# Patient Record
Sex: MALE | Race: WHITE | NOT HISPANIC OR LATINO | Employment: FULL TIME | URBAN - METROPOLITAN AREA
[De-identification: names, ages, dates, MRNs, and addresses within clinical notes are randomized per-mention and may not be internally consistent; named-entity substitution may affect disease eponyms.]

---

## 2017-01-06 ENCOUNTER — HOSPITAL ENCOUNTER (OUTPATIENT)
Dept: CARDIAC REHAB | Facility: CLINIC | Age: 62
End: 2017-01-06
Attending: FAMILY MEDICINE
Payer: COMMERCIAL

## 2017-01-06 PROCEDURE — 40000116 ZZH STATISTIC OP CR VISIT: Performed by: OCCUPATIONAL THERAPIST

## 2017-01-06 PROCEDURE — 93798 PHYS/QHP OP CAR RHAB W/ECG: CPT | Performed by: OCCUPATIONAL THERAPIST

## 2017-01-10 ENCOUNTER — HOSPITAL ENCOUNTER (OUTPATIENT)
Dept: CARDIAC REHAB | Facility: CLINIC | Age: 62
End: 2017-01-10
Attending: FAMILY MEDICINE
Payer: COMMERCIAL

## 2017-01-10 PROCEDURE — 40000116 ZZH STATISTIC OP CR VISIT: Performed by: REHABILITATION PRACTITIONER

## 2017-01-10 PROCEDURE — 93798 PHYS/QHP OP CAR RHAB W/ECG: CPT | Performed by: REHABILITATION PRACTITIONER

## 2017-01-11 ENCOUNTER — HOSPITAL ENCOUNTER (OUTPATIENT)
Dept: CARDIAC REHAB | Facility: CLINIC | Age: 62
End: 2017-01-11
Attending: FAMILY MEDICINE
Payer: COMMERCIAL

## 2017-01-11 PROCEDURE — 40000116 ZZH STATISTIC OP CR VISIT: Performed by: OCCUPATIONAL THERAPIST

## 2017-01-11 PROCEDURE — 93798 PHYS/QHP OP CAR RHAB W/ECG: CPT | Performed by: OCCUPATIONAL THERAPIST

## 2017-01-13 ENCOUNTER — HOSPITAL ENCOUNTER (OUTPATIENT)
Dept: CARDIAC REHAB | Facility: CLINIC | Age: 62
End: 2017-01-13
Attending: FAMILY MEDICINE
Payer: COMMERCIAL

## 2017-01-13 PROCEDURE — 40000116 ZZH STATISTIC OP CR VISIT: Performed by: REHABILITATION PRACTITIONER

## 2017-01-13 PROCEDURE — 93798 PHYS/QHP OP CAR RHAB W/ECG: CPT | Performed by: REHABILITATION PRACTITIONER

## 2017-01-18 ENCOUNTER — HOSPITAL ENCOUNTER (OUTPATIENT)
Dept: CARDIAC REHAB | Facility: CLINIC | Age: 62
End: 2017-01-18
Attending: FAMILY MEDICINE
Payer: COMMERCIAL

## 2017-01-18 PROCEDURE — 40000116 ZZH STATISTIC OP CR VISIT

## 2017-01-18 PROCEDURE — 93798 PHYS/QHP OP CAR RHAB W/ECG: CPT

## 2017-01-20 ENCOUNTER — HOSPITAL ENCOUNTER (OUTPATIENT)
Dept: CARDIAC REHAB | Facility: CLINIC | Age: 62
End: 2017-01-20
Attending: FAMILY MEDICINE
Payer: COMMERCIAL

## 2017-01-20 PROCEDURE — 93798 PHYS/QHP OP CAR RHAB W/ECG: CPT

## 2017-01-20 PROCEDURE — 40000116 ZZH STATISTIC OP CR VISIT

## 2017-02-02 VITALS — HEIGHT: 70 IN | WEIGHT: 223.8 LBS | BODY MASS INDEX: 32.04 KG/M2

## 2017-02-02 ASSESSMENT — 6 MINUTE WALK TEST (6MWT)
TOTAL DISTANCE WALKED (FT): 1370
FEMALE CALC: 1497.72
PREDICTED: 1820.56
MALE CALC: 1809.53

## 2017-02-02 NOTE — PROGRESS NOTES
02/02/17 1400   Session   Session 90 Day Individualized Treatment Plan   Certified through this date 03/04/17   Cardiac Rehab Assessment   Cardiac Rehab Assessment 11/14/2016 PT is recovering from NSTEMI with stenting of  RCA and LAD along with marginal arteries.  PT had MI and stenting back in 2005.  PT reports he is doing well no symptoms since hospitalization.  PT is planning to attend rehab to learn new ways of eating low fat and to advance his exercise and get back to work.    11/28/16  PT was educated regarding cv responses and progress in rehab thus far.  He reports that he feels exercise progression has been appropriate.  He denies any issues with arthritis and does not see this a limiting factor to rehab exercise at this time.  He does note a sensation of palpitations lasing a few minutes that he has had frequently in the afternoon around 1:00.  He has not had ectopy noted in rehab thus far.  His next rehab session is at this time and reinforced that he tell therapist if he has this experience then.  He has an appt with Dr. Hutton on 11/30 and was advised to discuss this with him also.  He continues to benefit from skilled cardiac rehab for safe exercise progression and education/behavior change counseling related to decision balance regarding dietary changes and weight loss.  12/19/2016 PT feels good about the progress he is making in rehab.  PT continues to benefit from skilled training to safely advance exercise and motivate for lifestyle changes regarding nutrition , sedentary lifestyle and weight loss .  PT is currently not doing home exercise he does have a treadmill at home however he has not been using it.  PT was instructed in benefits of exercise and he is planning to start exercise one additional time.  PTs goal is working on weight loss he has not founds success in this area he states he is trying to eat less. PT was offered food log but declined at this time.  PT still wants to work on this  goal.  PT is still interested in attending the nutrition classes.  He was given an additional calendar and plans to attend class on Wednesday.  PT continues to work towards goals. PT is thinking about if he wants to be completed by the end of his year with rehab.  PT is scheduled through Dec 30 he will let us know at his next visit.2/2 Patient is returning one more time for a discharge session.   General Information   Treatment Diagnosis NSTEMI   Date of Treatment Diagnosis 11/07/16   Secondary Treatment Diagnosis Stent   Significant Past CV History Previous MI;Previous PCI;Clinical significant depression or depressive symptoms   Comorbidities Previous MI   Other Medical History Had been on depression medications for a couple years but now is not on any    Lead up symptoms exertional chest pain with planned stress test then angio with stenting.   Hospital Location [facility]   Hospital Discharge Date 11/09/16   Signs and Symptoms Post Hospital Discharge Anxiety;Sleep;Palpitations   Outpatient Cardiac Rehab Start Date 11/14/16   Primary Physician Dominguez Dupont   Primary Physician Follow Up Completed   Cardiologist Brennen    Cardiologist Follow Up Scheduled   Ejection Fraction 63   Risk Stratification Low   Summary of Cath Report   Summary of Cath Report No information available   Living and Work Status    Living Arrangements and Social Status house   Support System Live with an adult   Return to Employment Yes   Occupation works for [employer]   Preventative Medications   CMS recommended medications Antiplatelets;Beta Blocker;Lipid Lowering;Influenza vaccination   Falls Screen   Have you fallen two or more times in the past year? No   Have you fallen and had an injury in the past year? No   Pain   Patient Currently in Pain Denies   Physical Assessments   Incisions WNL   Edema None   Right Lung Sounds not assessed   Left Lung Sounds not assessed   Limitations No limitations   Comments does have  "arthritis in knees and back does not bother him on a daily basis    Individualized Treatment Plan   Monitored Sessions Scheduled 24   Monitored Sessions Attended 21   Oxygen   Supplemental Oxygen needed No   Nutrition Management - Weight Management   Assessment Re-assessment   Age 60   Weight 101.515 kg (223 lb 12.8 oz)   Height 1.778 m (5' 10\")   BMI (Calculated) 32.18   Initial Rate Your Plate Score. Dietary tool to assess eating patterns. Scores range from 24 to 72. The higher the score the healthier the eating pattern. 50   Weight Management Comments PT desires to work on weight loss see goals.  11/28/16  See goal.   12/19/2016 PT has gained 5 pounds since he started rehab.  PT continues to request to work on this goal however he is not willing to commit to doing something different like logging food or activity.   Nutrition Management - Lipids   Lipids Labs Available   Date 11/07/16   Total Cholesterol 146   Triglycerides 48   HDL 67   LDL 79   Prescribed Lipid Medication Yes   Statin Intensity High Intensity   Nutrition Management - Diabetes   Diabetes No   Nutrition Management Summary   Dietary Recommendations Low Fat;Low Sodium   Stages of Change for Diet Compliance Action   Interventions Planned Attend Nutrition Education Class(es);Educate on Benefits of Exercise   Patient Goals Goal #1;Goal #2   Goal #1 Description 11/14/2016 PT will work on losing weight 1-2 pounds per week to improve overall health    Goal #1 Target Date 01/13/17   Goal #1 Progress Towards Goal 11/28/16  PT is up in weight about 1.2 lbs since initial rehab session.  The trend has been variable and increase showed up after the holiday.  He reports that this is still a goal, however he appears to be in contemplation for dietary changes.  12/19/2016 Pt is up 5 pounds since he started rehab.  PT is not wanting to try a different approach at this time.  PT declined logging food or logging activity. PT plans to continue to work on eating less " "to assist with this goal.  2/2 2/2 Patient's weight continues to rise.   Goal #2 Description 11/14/2016 PT will attend the 3 nutrition classes to learn new ways to lower fat into his current diet.     Goal #2 Target Date 01/13/17   Goal #2 Progress Towards Goal 11/28/16  PT has not yet attended education classes.  There is a nutrition class next session, however he is unable to go due to M.D. appt.  He was provided with education class calendar as he states he is interested, however he is assessed to be in Contemplation for dietary changes.  Worked on verbal decision balance and encouraged pro/con list.  PT is generally stoic and states \"I know that I just need to do it.\"  He was attentive, however unable to verbalize any specific barriers.  He denies time or lack of knowledge to be factors.  He reports that he recognizes the benefits of dietary change in his weight loss goal and in decreasing CAD risk.  Encouraged small changes and attending dietary classes to increase knowledge as he contiues to identify this as an interest area.        12/19/2016 PT has not attended the nutrition classes yet he was strongly encouraged to make time for nutrition class. PT states he is planning to attend the nutrition 3 class on Dec 212/2 Patient dd not attend any nutrition class/   Nutrition Summary Comments PT is working on changing his diet , doing more frest foods vs processed foods.    11/28/16  PT has \"not really\" been concentrating on this anymore.  See goal above for details.      Nutrition Target Outcome Weight loss .5-1 lb/week (if BMI > 25)   Psychosocial Management   Psychosocial Assessment Re-assessment   Is there history of clinical depression or increased risk of depression? History of clinical depression   Current Level of Stress per Patient Report Moderate   (work related )   Current Coping Skills Has Positive Support System   Initial Patient Health Questionnaire -9 Score (PHQ-9) for depression. 5-9 Minimal " "symptoms, 10-14 Minor depression, 15-19 Major depression, moderately severe, > 20 Major depression, severe  3   Initial [facility] Survey score.  Quality of Life:   If total score > 25 review individual areas where patient rated a 4 or 5.  Consider patients current medical condition and what role that plays on the score.   Adjust treatment protocol to improve areas of concern.  Consider the following:  PHQ9 score, DASI, and re-assessment within the next 30 days to assist with developing treatments.  19   Stages of Change Pre-Contemplation   Interventions Planned Patient to verbalize understanding of negative impact of stress to personal health;Patient will recognize signs and symptoms of depression;Patient to attend stress management class(es);Provide resources for stress relaxation   Interventions In Progress or Completed Patient verbalizes understanding of behavioral assessment scores;Patient verbalizes understanding of negative impact of stress to personal health   Psychosocial Comments PT is raising 15 year old grand daughter  11/28/16  PT is generally stoic and gives short answers to questions.  He was educated regarding survey scores and impact of stress on health.   PHQ-9 score was WNL's and PT denies feelings of depression.  He continues to identify stress as \"moderate.\"  12/19/2016 PT continues to rate day to day stress as moderate he is not wanting to work on ways to reduce stress at this time.    Other Core Components - Hypertension   History of or Diagnosis of Hypertension No   Currently taking Anti-Hypertensives Yes;Beta blocker   Other Core Components - Tobacco   History of Tobacco Use Yes   Quit Date or Planned Quit Date 01/01/70   Tobacco Use Status Former (Quit > 6 mo ago)   Tobacco Habit Cigarettes   Stages of Change Maintenance   Other Core Components Summary   Interventions Planned None   Activity/Exercise History   Activity/Exercise Assessment Re-assessment   Activity/Exercise Status prior " to event? Was Physically Active   Number of Days Currently participating in Moderate Physical Activity? 5   Number of Days Currently performing  Aerobic Exercise (including rehab)? 3   Number of Minutes per Session Currently of Aerobic Exercise (average)? 35   Current Stage of Change (Physical Activity) Maintenance   Current Stage of Change (Aerobic Exercise) Pre-Contemplation   Activity/Exercise Comments PT is in precontemplation for home [id number]/19/2016 PT was instructed in benefits of exercise.  PT is planning to start using his treadmill at home at least one more day in addition to his rehab participation    Activity/Exercise Target Outcome An Accumulation of 150  Minutes of Aerobic Activity per Week   Exercise Assessment   6 Minute Walk Predicted (Male) 1809.53   6 Minute Walk Predicted (Female) 1497.72   Initial 6 Minute Walk Distance (Feet) 1370 ft   Resting HR 67 bpm  (HR and BPs from 1/20)   Exercise  bpm   Post Exercise HR 75 bpm   Resting /62 mmHg   Exercise /60 mmHg   Post Exercise /68 mmHg   Effort Rating 5   Current MET Level 4   MET Level Goal 5-6 mets   ECG Rhythm Normal sinus rhythm   Ectopy None   Current Symptoms Denies symptoms   Limitations/Restrictions None   Exercise Prescription   Mode Treadmill;Airdyne;Nustep;Weights   Duration/Time 30-45 min   Frequency 3 daysweek   THR (85% of age predicted max HR) 135.15   OMNI Effort Rating (0-10 Scale) 4-6/10   Progression Continuous bouts;Total exercise time of 30-45 minutes;Aerobic exercise to OMNI rating of 5-7, and heart rate at or below target;Progress peak intensity by 1/2 MET per week   Recommended Home Exercise   Type of Exercise Walking   Frequency (days per week) 2-3 days in addition to rehab    Duration (minutes per session) 30-45 min   Effort Rating Recommended 4-6/10   30 Day Exercise Plan PT to attend rehab 3 days per week he is not interested at this time in home exercise will continue to explain the benefits of  regular exercise on his overall health.   Current Home Exercise   Type of Exercise None   Follow-up/On-going Support   Provider follow-up needed on the following Other (see comments)   Comments PT wants an ok of when he can return to work.     Learning Assessment   Learner Patient   Primary Language English   Preferred Learning Style Listening;Reading;Demonstration   Barriers to Learning No barriers noted   Patient Education   Education recommended Anatomy and Physiology of the Heart;Exercise Principles;Medication Overview;Muscle Conditioning;Nutrition;Stress Management   Education Comments 11/28/16  PT has not yet attended education classes.  He was encouraged and provided with class calendar. 12/19/2016 PT is planning to attend the nutrition classes.     Physician cosignature/electronic signature indicates approval of this ITP document. I have established, reviewed and made necessary changes to the individualized treatment plan and exercise prescription for this patient.

## 2017-02-02 NOTE — ADDENDUM NOTE
Encounter addended by: Lesli Aragon OT on: 2/2/2017  2:32 PM<BR>     Documentation filed: Clinical Notes, Inpatient Document Flowsheet

## 2017-02-09 ENCOUNTER — HOSPITAL ENCOUNTER (OUTPATIENT)
Dept: CARDIAC REHAB | Facility: CLINIC | Age: 62
End: 2017-02-09
Attending: FAMILY MEDICINE
Payer: COMMERCIAL

## 2017-02-09 VITALS — HEIGHT: 70 IN | BODY MASS INDEX: 32.04 KG/M2 | WEIGHT: 223.8 LBS

## 2017-02-09 PROCEDURE — 40000116 ZZH STATISTIC OP CR VISIT: Performed by: REHABILITATION PRACTITIONER

## 2017-02-09 PROCEDURE — 40000575 ZZH STATISTIC OP CARDIAC VISIT #2: Performed by: REHABILITATION PRACTITIONER

## 2017-02-09 PROCEDURE — 93798 PHYS/QHP OP CAR RHAB W/ECG: CPT | Performed by: REHABILITATION PRACTITIONER

## 2017-02-09 ASSESSMENT — 6 MINUTE WALK TEST (6MWT)
MALE CALC: 1809.53
TOTAL DISTANCE WALKED (FT): 1370
PREDICTED: 1820.56
FEMALE CALC: 1497.72

## 2017-02-09 NOTE — PROGRESS NOTES
Physician cosignature/electronic signature indicates approval of this ITP document. I have established, reviewed and made necessary changes to the individualized treatment plan and exercise prescription for this patient.       02/09/17 1300   Session   Session Discharge Note   Certified through this date 03/04/17   Cardiac Rehab Assessment   Cardiac Rehab Assessment 11/28/16  PT was educated regarding cv responses and progress in rehab thus far.  He reports that he feels exercise progression has been appropriate.  He denies any issues with arthritis and does not see this a limiting factor to rehab exercise at this time.  He does note a sensation of palpitations lasing a few minutes that he has had frequently in the afternoon around 1:00.  He has not had ectopy noted in rehab thus far.  His next rehab session is at this time and reinforced that he tell therapist if he has this experience then.  He has an appt with Dr. Hutton on 11/30 and was advised to discuss this with him also.  He continues to benefit from skilled cardiac rehab for safe exercise progression and education/behavior change counseling related to decision balance regarding dietary changes and weight loss.  12/19/2016 PT feels good about the progress he is making in rehab.  PT continues to benefit from skilled training to safely advance exercise and motivate for lifestyle changes regarding nutrition , sedentary lifestyle and weight loss .  PT is currently not doing home exercise he does have a treadmill at home however he has not been using it.  PT was instructed in benefits of exercise and he is planning to start exercise one additional time.  PTs goal is working on weight loss he has not founds success in this area he states he is trying to eat less. PT was offered food log but declined at this time.  PT still wants to work on this goal.  PT is still interested in attending the nutrition classes.  He was given an additional calendar and plans to  attend class on Wednesday.  PT continues to work towards goals. PT is thinking about if he wants to be completed by the end of his year with rehab.  PT is scheduled through Dec 30 he will let us know at his next visit.2/2 Patient is returning one more time for a discharge session.  2/9 PHQ score increased as patient has not been able to return to work.  He is still on short-term disability and is waiting from his previous company for them to respond.  Patient radha did not meet any of his goals.  He has started a regular exercise program at home similar in progression to rehab.  He has been using the TM and weights every other day.  He did progress well in exercise intensity and CV response were wnls.  Patient declined any handouts for aerobic exercise and weights.     General Information   Treatment Diagnosis NSTEMI   Date of Treatment Diagnosis 11/07/16   Secondary Treatment Diagnosis Stent   Significant Past CV History Previous MI;Previous PCI;Clinical significant depression or depressive symptoms   Comorbidities Previous MI   Other Medical History Had been on depression medications for a couple years but now is not on any    Lead up symptoms exertional chest pain with planned stress test then angio with stenting.   Hospital Location [facility]   Hospital Discharge Date 11/09/16   Signs and Symptoms Post Hospital Discharge Anxiety;Sleep;Palpitations   Outpatient Cardiac Rehab Start Date 11/14/16   Primary Physician Dominguez Dupont   Primary Physician Follow Up Completed   Cardiologist Brennen    Cardiologist Follow Up Scheduled   Ejection Fraction 63   Risk Stratification Low   Summary of Cath Report   Summary of Cath Report No information available   Living and Work Status    Living Arrangements and Social Status house   Support System Live with an adult   Return to Employment Yes   Occupation works for [employer]   Preventative Medications   CMS recommended medications Antiplatelets;Beta  "Blocker;Lipid Lowering;Influenza vaccination   Falls Screen   Have you fallen two or more times in the past year? No   Have you fallen and had an injury in the past year? No   Pain   Patient Currently in Pain Denies   Physical Assessments   Incisions Not assessed   Edema None   Right Lung Sounds not assessed   Left Lung Sounds not assessed   Limitations No limitations   Comments does have arthritis in knees and back does not bother him on a daily basis    Individualized Treatment Plan   Monitored Sessions Scheduled 24   Monitored Sessions Attended 22   Oxygen   Supplemental Oxygen needed No   Nutrition Management - Weight Management   Assessment Re-assessment   Age 60   Weight 101.515 kg (223 lb 12.8 oz)   Height 1.778 m (5' 10\")   BMI (Calculated) 32.18   Initial Rate Your Plate Score. Dietary tool to assess eating patterns. Scores range from 24 to 72. The higher the score the healthier the eating pattern. 50   Discharge Rate Your Plate Score 53   Weight Management Comments PT desires to work on weight loss see goals.  11/28/16  See goal.   12/19/2016 PT has gained 5 pounds since he started rehab.  PT continues to request to work on this goal however he is not willing to commit to doing something different like logging food or activity.   Nutrition Management - Lipids   Lipids Labs Available   Date 11/07/16   Total Cholesterol 146   Triglycerides 48   HDL 67   LDL 79   Prescribed Lipid Medication Yes   Statin Intensity High Intensity   Nutrition Management - Diabetes   Diabetes No   Nutrition Management Summary   Dietary Recommendations Low Fat;Low Sodium   Stages of Change for Diet Compliance Action   Interventions Planned Attend Nutrition Education Class(es);Educate on Benefits of Exercise   Patient Goals Goal #1;Goal #2   Goal #1 Description 11/14/2016 PT will work on losing weight 1-2 pounds per week to improve overall health    Goal #1 Target Date 01/13/17   Goal #1 Progress Towards Goal 11/28/16  PT is up in " "weight about 1.2 lbs since initial rehab session.  The trend has been variable and increase showed up after the holiday.  He reports that this is still a goal, however he appears to be in contemplation for dietary changes.  12/19/2016 Pt is up 5 pounds since he started rehab.  PT is not wanting to try a different approach at this time.  PT declined logging food or logging activity. PT plans to continue to work on eating less to assist with this goal.  2/2 2/2 Patient's weight continues to rise.  2/9 Patient did not meet goal.   Goal #2 Description 11/14/2016 PT will attend the 3 nutrition classes to learn new ways to lower fat into his current diet.     Goal #2 Target Date 01/13/17   Goal #2 Progress Towards Goal 11/28/16  PT has not yet attended education classes.  There is a nutrition class next session, however he is unable to go due to M.D. appt.  He was provided with education class calendar as he states he is interested, however he is assessed to be in Contemplation for dietary changes.  Worked on verbal decision balance and encouraged pro/con list.  PT is generally stoic and states \"I know that I just need to do it.\"  He was attentive, however unable to verbalize any specific barriers.  He denies time or lack of knowledge to be factors.  He reports that he recognizes the benefits of dietary change in his weight loss goal and in decreasing CAD risk.  Encouraged small changes and attending dietary classes to increase knowledge as he contiues to identify this as an interest area.        12/19/2016 PT has not attended the nutrition classes yet he was strongly encouraged to make time for nutrition class. PT states he is planning to attend the nutrition 3 class on Dec 212/2 Patient dd not attend any nutrition class/  2/9 Patient did not attend any of the education classes   Nutrition Summary Comments PT is working on changing his diet , doing more frest foods vs processed foods.    11/28/16  PT has \"not really\" " "been concentrating on this anymore.  See goal above for details.      Nutrition Target Outcome Weight loss .5-1 lb/week (if BMI > 25)   Psychosocial Management   Psychosocial Assessment Re-assessment   Is there history of clinical depression or increased risk of depression? History of clinical depression   Current Level of Stress per Patient Report Moderate   (work related )   Current Coping Skills Has Positive Support System   Initial Patient Health Questionnaire -9 Score (PHQ-9) for depression. 5-9 Minimal symptoms, 10-14 Minor depression, 15-19 Major depression, moderately severe, > 20 Major depression, severe  3   Discharge PHQ-9 Score for Depression 7   [facility].  Quality of Life:   If total score > 25 review individual areas where patient rated a 4 or 5.  Consider patients current medical condition and what role that plays on the score.   Adjust treatment protocol to improve areas of concern.  Consider the following:  PHQ9 score, DASI, and re-assessment within the next 30 days to assist with developing treatments.  19   Discharge [facility] Survey Score 19   Stages of Change Pre-Contemplation   Interventions Planned Patient to verbalize understanding of negative impact of stress to personal health;Patient will recognize signs and symptoms of depression;Patient to attend stress management class(es);Provide resources for stress relaxation   Interventions In Progress or Completed Patient verbalizes understanding of behavioral assessment scores;Patient verbalizes understanding of negative impact of stress to personal health   Psychosocial Comments PT is raising 15 year old grand daughter  11/28/16  PT is generally stoic and gives short answers to questions.  He was educated regarding survey scores and impact of stress on health.   PHQ-9 score was WNL's and PT denies feelings of depression.  He continues to identify stress as \"moderate.\"  12/19/2016 PT continues to rate day to day " stress as moderate he is not wanting to work on ways to reduce stress at this time.    Other Core Components - Hypertension   History of or Diagnosis of Hypertension No   Currently taking Anti-Hypertensives Yes;Beta blocker   Other Core Components - Tobacco   History of Tobacco Use Yes   Quit Date or Planned Quit Date 01/01/70   Tobacco Use Status Former (Quit > 6 mo ago)   Tobacco Habit Cigarettes   Stages of Change Maintenance   Other Core Components Summary   Interventions Planned None   Activity/Exercise History   Activity/Exercise Assessment Re-assessment   Activity/Exercise Status prior to event? Was Physically Active   Number of Days Currently participating in Moderate Physical Activity? 5   Number of Days Currently performing  Aerobic Exercise (including rehab)? 4   Number of Minutes per Session Currently of Aerobic Exercise (average)? 35   Current Stage of Change (Physical Activity) Maintenance   Current Stage of Change (Aerobic Exercise) Pre-Contemplation   Activity/Exercise Comments PT is in precontemplation for home [id number]/19/2016 PT was instructed in benefits of exercise.  PT is planning to start using his treadmill at home at least one more day in addition to his rehab participation  2/9 Patient has been using the treadmill everyother day for 35-40 min and has been slowly increasing his intensities.  He has also been completing the muscle conditioning exercises.   Activity/Exercise Target Outcome An Accumulation of 150  Minutes of Aerobic Activity per Week   Exercise Assessment   6 Minute Walk Predicted (Male) 1809.53   6 Minute Walk Predicted (Female) 1497.72   Initial 6 Minute Walk Distance (Feet) 1370 ft   Resting HR 73 bpm   Exercise HR 98 bpm   Post Exercise HR 73 bpm   Resting /62 mmHg   Exercise /68 mmHg   Post Exercise /70 mmHg   Effort Rating 5   Current MET Level 4   MET Level Goal 5-6 mets   ECG Rhythm Normal sinus rhythm   Ectopy None   Current Symptoms Denies symptoms    Limitations/Restrictions None   Exercise Prescription   Mode Treadmill;Airdyne;Nustep;Weights   Duration/Time 30-45 min   Frequency 3 daysweek   THR (85% of age predicted max HR) 135.15   OMNI Effort Rating (0-10 Scale) 4-6/10   Progression Continuous bouts;Total exercise time of 30-45 minutes;Aerobic exercise to OMNI rating of 5-7, and heart rate at or below target;Progress peak intensity by 1/2 MET per week   Recommended Home Exercise   Type of Exercise Walking   Frequency (days per week) 2-3 days in addition to rehab    Duration (minutes per session) 30-45 min   Effort Rating Recommended 4-6/10   30 Day Exercise Plan PT to attend rehab 3 days per week he is not interested at this time in home exercise will continue to explain the benefits of regular exercise on his overall health.   Current Home Exercise   Type of Exercise Treadmill  3-4 days/wk for 35 min   Follow-up/On-going Support   Provider follow-up needed on the following Other (see comments)   Comments PT wants an ok of when he can return to work.     Learning Assessment   Learner Patient   Primary Language English   Preferred Learning Style Listening;Reading;Demonstration   Barriers to Learning No barriers noted   Patient Education   Education recommended Anatomy and Physiology of the Heart;Exercise Principles;Medication Overview;Muscle Conditioning;Nutrition;Stress Management   Education Comments 11/28/16  PT has not yet attended education classes.  He was encouraged and provided with class calendar. 12/19/2016 PT is planning to attend the nutrition classes.

## 2019-03-26 ENCOUNTER — HOSPITAL ENCOUNTER (EMERGENCY)
Facility: CLINIC | Age: 64
Discharge: HOME | End: 2019-03-26
Attending: PHYSICIAN ASSISTANT | Admitting: PHYSICIAN ASSISTANT
Payer: COMMERCIAL

## 2019-03-26 ENCOUNTER — APPOINTMENT (OUTPATIENT)
Dept: CT IMAGING | Facility: CLINIC | Age: 64
End: 2019-03-26
Attending: PHYSICIAN ASSISTANT
Payer: COMMERCIAL

## 2019-03-26 VITALS
HEIGHT: 70 IN | BODY MASS INDEX: 32.93 KG/M2 | HEART RATE: 81 BPM | WEIGHT: 230 LBS | DIASTOLIC BLOOD PRESSURE: 74 MMHG | OXYGEN SATURATION: 99 % | RESPIRATION RATE: 18 BRPM | TEMPERATURE: 98.8 F | SYSTOLIC BLOOD PRESSURE: 158 MMHG

## 2019-03-26 DIAGNOSIS — T50.8X5A ALLERGIC REACTION TO CONTRAST DYE, INITIAL ENCOUNTER: ICD-10-CM

## 2019-03-26 DIAGNOSIS — M54.2 NECK PAIN ON LEFT SIDE: ICD-10-CM

## 2019-03-26 PROCEDURE — 70450 CT HEAD/BRAIN W/O DYE: CPT

## 2019-03-26 PROCEDURE — 25000125 ZZHC RX 250: Performed by: PHYSICIAN ASSISTANT

## 2019-03-26 PROCEDURE — 96375 TX/PRO/DX INJ NEW DRUG ADDON: CPT

## 2019-03-26 PROCEDURE — 25000132 ZZH RX MED GY IP 250 OP 250 PS 637: Performed by: PHYSICIAN ASSISTANT

## 2019-03-26 PROCEDURE — 25000128 H RX IP 250 OP 636: Performed by: PHYSICIAN ASSISTANT

## 2019-03-26 PROCEDURE — 99285 EMERGENCY DEPT VISIT HI MDM: CPT | Mod: 25

## 2019-03-26 PROCEDURE — 96374 THER/PROPH/DIAG INJ IV PUSH: CPT | Mod: 59

## 2019-03-26 PROCEDURE — 70498 CT ANGIOGRAPHY NECK: CPT

## 2019-03-26 RX ORDER — DIPHENHYDRAMINE HYDROCHLORIDE 50 MG/ML
50 INJECTION INTRAMUSCULAR; INTRAVENOUS ONCE
Status: COMPLETED | OUTPATIENT
Start: 2019-03-26 | End: 2019-03-26

## 2019-03-26 RX ORDER — METHYLPREDNISOLONE SODIUM SUCCINATE 125 MG/2ML
125 INJECTION, POWDER, LYOPHILIZED, FOR SOLUTION INTRAMUSCULAR; INTRAVENOUS ONCE
Status: COMPLETED | OUTPATIENT
Start: 2019-03-26 | End: 2019-03-26

## 2019-03-26 RX ORDER — IOPAMIDOL 755 MG/ML
70 INJECTION, SOLUTION INTRAVASCULAR ONCE
Status: COMPLETED | OUTPATIENT
Start: 2019-03-26 | End: 2019-03-26

## 2019-03-26 RX ORDER — ACETAMINOPHEN 325 MG/1
650 TABLET ORAL ONCE
Status: COMPLETED | OUTPATIENT
Start: 2019-03-26 | End: 2019-03-26

## 2019-03-26 RX ORDER — CYCLOBENZAPRINE HCL 10 MG
10 TABLET ORAL 3 TIMES DAILY PRN
Qty: 20 TABLET | Refills: 0 | Status: SHIPPED | OUTPATIENT
Start: 2019-03-26 | End: 2019-04-01

## 2019-03-26 RX ORDER — CYCLOBENZAPRINE HCL 10 MG
10 TABLET ORAL ONCE
Status: COMPLETED | OUTPATIENT
Start: 2019-03-26 | End: 2019-03-26

## 2019-03-26 RX ADMIN — CYCLOBENZAPRINE HYDROCHLORIDE 10 MG: 10 TABLET, FILM COATED ORAL at 17:36

## 2019-03-26 RX ADMIN — METHYLPREDNISOLONE SODIUM SUCCINATE 125 MG: 125 INJECTION, POWDER, FOR SOLUTION INTRAMUSCULAR; INTRAVENOUS at 18:42

## 2019-03-26 RX ADMIN — SODIUM CHLORIDE 90 ML: 9 INJECTION, SOLUTION INTRAVENOUS at 18:33

## 2019-03-26 RX ADMIN — DIPHENHYDRAMINE HYDROCHLORIDE 50 MG: 50 INJECTION, SOLUTION INTRAMUSCULAR; INTRAVENOUS at 18:39

## 2019-03-26 RX ADMIN — IOPAMIDOL 70 ML: 755 INJECTION, SOLUTION INTRAVENOUS at 18:33

## 2019-03-26 RX ADMIN — ACETAMINOPHEN 650 MG: 325 TABLET, FILM COATED ORAL at 17:36

## 2019-03-26 ASSESSMENT — ENCOUNTER SYMPTOMS
NUMBNESS: 0
FEVER: 0
WEAKNESS: 0
SHORTNESS OF BREATH: 0
HEADACHES: 1
NECK PAIN: 1

## 2019-03-26 ASSESSMENT — MIFFLIN-ST. JEOR: SCORE: 1839.52

## 2019-03-26 NOTE — ED NOTES
Pt returned from CT c/o itching following contrast. Pt w/ rash to torso and left arm. On pulse ox sats at 99% RA

## 2019-03-26 NOTE — ED AVS SNAPSHOT
Emergency Department  [address] [place] [id number]  Phone:  [phone]  Fax:  [phone]                                    Que Corral   MRN: [id number]    Department:   Emergency Department   Date of Visit:  3/26/2019           After Visit Summary Signature Page    I have received my discharge instructions, and my questions have been answered. I have discussed any challenges I see with this plan with the nurse or doctor.    ..........................................................................................................................................  Patient/Patient Representative Signature      ..........................................................................................................................................  Patient Representative Print Name and Relationship to Patient    ..................................................               ................................................  Date                                   Time    ..........................................................................................................................................  Reviewed by Signature/Title    ...................................................              ..............................................  Date                                               Time          22EPIC Rev 08/18

## 2019-03-26 NOTE — ED PROVIDER NOTES
"  History     Chief Complaint:  Neck Pain    The history is provided by the patient.      Que Corral is a 63 year old male nonsmoker with a history of hypertension and MI (s/p 7 cardiac stents) who presents for evaluation of neck pain. The patient reports that he was at baseline before Sunday morning (2 days ago) when he woke up with a \"cramp\" in his posterior neck which radiates somewhat into his left shoulder. States that this is made worse by sitting up and moving around. Notes that the night before this onset he slept on \"crappy\" pillows at [place]. Also endorses mild headache which seems to be isolated to the posterior left. Has been treating this with a heating pad and Advil (most recent dose at 12:00 PM today) which have somewhat helped. Denies history of similar pain. Denies recent chiropractic treatment. Patient denies abnormal vision, trauma, numbness/tingling, weakness, chest pain, shortness of breath, fever, or other complaint.     Allergies:  Contrast Dye     Medications:    Aspirin   Lipitor  Vitamin D3  Metoprolol  Nitroglycerin    Past Medical History:    Anxiety  Hypertension  MI  Stented coronary artery    Past Surgical History:    Arthroplasty carpometacarpal (thumb joint)  Coronary stent  Aortic stent    Family History:    History reviewed. No pertinent family history.      Social History:  Presents with spouse   Tobacco use: Former smoker  Alcohol use: Yes (35 cans of beer per week)  PCP: Dominguez Dupont    Marital Status:       Review of Systems   Constitutional: Negative for fever.   Eyes: Negative for visual disturbance.   Respiratory: Negative for shortness of breath.    Cardiovascular: Negative for chest pain.   Musculoskeletal: Positive for neck pain.   Neurological: Positive for headaches (mild). Negative for weakness and numbness.   All other systems reviewed and are negative.    Physical Exam     Patient Vitals for the past 24 hrs:   BP Temp Temp src Pulse Heart Rate " "Resp SpO2 Height Weight   03/26/19 2000 158/74 -- -- -- 80 18 99 % -- --   03/26/19 1945 -- -- -- -- -- -- 95 % -- --   03/26/19 1905 -- -- -- -- -- -- 96 % -- --   03/26/19 1900 -- -- -- -- -- -- 95 % -- --   03/26/19 1644 163/86 98.8  F (37.1  C) Oral 81 -- 18 98 % 1.778 m (5' 10\") 104.3 kg (230 lb)      Physical Exam  General: Resting comfortably.  Alert and oriented.   Head:  The scalp, face, and head appear normal   Eyes:  The pupils are equal, round, and reactive to light     Extraocular muscles are intact    Conjunctivae and sclerae are normal    ENT:    The oropharynx is normal    Uvula is in the midline     Bilateral TMs are normal without evidence of infection.   Neck:  Normal range of motion    There is no rigidity noted    No lymphadenopathy    There is no midline cervical spine pain/tenderness     There is left-sided paracervical tenderness.  CV:  Regular rate and rhythm     Normal S1/S2    Radial pulses intact bilaterally.  Resp:  Lungs are clear to auscultation    Non-labored    No rales or wheezing   MS:  Tenderness to the left paracervical, and trapezial areas.  There is no tenderness to the left arm. The patient has preserved bicep flexion, and tricep extension bilaterally.  Preserved deltoid strength bilaterally.  Patient can hold fingers and resisted abduction, hold the okay sign against resistance, and touch all fingers to the thumb.  Patient has good strength with wrist extension.  Skin:  No rash or acute skin lesions noted   Neuro: Speech is normal and fluent. Cranial nerves 2-12 grossly intact.  strength is equal. Strength and sensation intact in all 4 extremities. Finger-nose finger and heel shin intact. No focal deficits.     Emergency Department Course     Imaging:  Radiographic findings were communicated with the patient and family who voiced understanding of the findings.    CT Head without contrast:  IMPRESSION: No acute intracranial abnormality.    CT Head Neck Angio without & with " contrast:  IMPRESSION:   CTA head: Patent vasculature with mild atherosclerotic plaquing.    CTA neck: No evidence of dissection. Mild plaquing at the carotid bifurcations without evidence of flow-limiting stenosis. Mild plaquing at the right vertebral artery origin.    Imaging independently reviewed and agree with radiologist interpretation.       Interventions:  1736: Tylenol 650 mg PO  1736: Flexeril 10 mg PO  1839: Benadryl 50 mg IV   1842: Solu-Medrol 125 mg IV    Emergency Department Course:  Past medical records, nursing notes, and vitals reviewed.  1715: I performed an exam of the patient and obtained history, as documented above.    IV inserted and blood drawn. Above interventions provided. Blood was sent to the lab for further testing, results above.    1835: The patient began to experience hives and itching after administration of contrast. I rechecked the patient. Benadryl given. Plan will be to CT the head with no contrast. Patient and spouse are okay with this plan.    The patient was sent for a CT, CTA while in the emergency department, findings above.    1925: The patient's hives are looking improved.    1901: I rechecked the patient.  Findings and plan explained to the Patient. Patient discharged home with instructions regarding supportive care, medications, and reasons to return. The importance of close follow-up was reviewed.       Impression & Plan      Medical Decision Making:  Que Corral is a 63 year old male who presents for evaluation of left-sided neck pain.  He states that he felt onset when he woke up at the [employer] 2 days ago after sleeping in a hotel. He awoke and felt this pain to the left side of the neck which traverses down his left trapezius which has increased in intensity.  He was initially evaluated by his primary care doctor and suggested he come here for evaluation, possible head CT, and cervical x-rays. Denies any trauma or falls, therefore, I did not think x-rays  would be helpful at this time given the very low likelihood of fracture or any other abnormalities.  Certainly, the patient does have risk factors for vessel injury given his history of coronary artery disease.  The patient has no neurologic symptoms but does intermittently complain of a headache associated with this.  He denies any visual changes.  We discussed the possibilities of management at this point including a CT of the head and neck vs watchful waiting at home.  After a discussion and shared decision-making, patient opted for CTA imaging. Fortunately, this was unremarkable for any signs of dissection or any acute abnormality.  This does demonstrate some plaque formation in the left vertebral and carotid arteries as well as evidence of stenosis.  Upon administration of IV contrast, the patient did have hives and itchy rash.  There is no development of airway symptoms and the patient did not require epinephrine.  The patient instead was given Benadryl and Solu-Medrol.  After about 30 minutes, the hives diminished and his skin returned to baseline and the itching has stopped.  This reaction was noted in the patient's chart.  After the observation, the patient is asymptomatic and has no ongoing airway symptoms or any concern that he would need to stay here for further evaluation.  I do not think that further workup needs to be pursued at this time and I think his symptoms are most likely due to a musculoskeletal source.  Patient does feel improved after a muscle relaxer here.  Patient was sent home with flexeril prescription and advised to take ibuprofen and Tylenol.  He was asked to follow-up with his primary care doctor in 2-3 days for return.  He was also asked to return immediately for any weakness, tingling, uncontrolled pain, fevers, or any other concerns.  All questions were answered prior to discharge.  Patient understands and agrees to this plan.    Diagnosis:    ICD-10-CM   1. Neck pain on left side  M54.2   2. Allergic reaction to contrast dye, initial encounter T50.8X5A       Disposition:  Discharged to home with plan as outlined.     Scribe Disclosure:  I, Jose Salinas, am serving as a scribe at 5:19 PM on 3/26/2019 to document services personally performed by Roshni Humphreys PA based on my observations and the provider's statements to me.  3/26/2019   EMERGENCY DEPARTMENT     Roshni Humphreys PA-C  03/26/19 [id number]

## 2019-03-27 NOTE — DISCHARGE INSTRUCTIONS
Discharge Instructions  Neck Strain    You have been seen today for a neck sprain or strain.  Neck strains usually result from an injury to the neck. Car accidents, contact sports, and falls are common causes of neck strain. Sometimes your neck can start to hurt because of increased activity, muscle tension, an abnormal sleeping position, or because of other problems like arthritis in the neck.     Neck pain usually comes from injured muscles and ligaments. Sometimes there is a herniated (?slipped?) disc. We do not usually do MRI scans to look for these right away, since most herniated discs will get better on their own with time. Today, we did not find any evidence that your neck pain was caused by a serious or dangerous condition. However, sometimes symptoms develop over time and cannot be found during an emergency visit, so it is very important that you follow up with your primary provider.    Generally, every Emergency Department visit should have a follow-up clinic visit with either a primary or a specialty clinic/provider. Please follow-up as instructed by your emergency provider today.    Return to the Emergency Department if:  You have increasing pain in your neck.  You develop difficulty swallowing or breathing.  You have numbness, weakness, or trouble moving your arms or legs.  You have severe dizziness and difficulty walking.  You are unable to control your bladder or bowels.  You develop severe headache or ringing in the ears.    What can I do to help myself at home?  If you had an injury, use cold for the first 1-2 days. Cold helps relieve pain and reduce inflammation.  Apply ice packs to the neck or areas of pain every 1-2 hours for 20 minutes at a time. Place a towel or cloth between your skin and the ice pack.  After the first 2 days, using heat can help with neck pain and stiffness. You may use a warm shower or bath, warm towels on the neck, or a heating pad. Do not sleep with a heating pad, as you  can be burned.   Pain medications - You may take a pain medication such as Tylenol  (acetaminophen), Advil  and Motrin  (ibuprofen), or Aleve  (naproxen).  It is usually best to rest the neck for 1-2 days after an injury, then start gentle stretching exercises.   It is helpful to place a small pillow under the nape of your neck to provide proper neutral positioning.   You should stay active and do your usual work as much as you can, unless this involves heavy physical labor. Ask your provider if you need work restrictions.  If you were given a prescription for medicine here today, be sure to read all of the information (including the package insert) that comes with your prescription.  This will include important information about the medicine, its side effects, and any warnings that you need to know about.  The pharmacist who fills the prescription can provide more information and answer questions you may have about the medicine.  If you have questions or concerns that the pharmacist cannot address, please call or return to the Emergency Department.   Remember that you can always come back to the Emergency Department if you are not able to see your regular provider in the amount of time listed above, if you get any new symptoms, or if there is anything that worries you.    Discharge Instructions  Allergic Reaction    An allergic reaction can result in a rash, itching, swelling, watery eyes, or a runny nose. A serious reaction can cause swelling of your mouth or throat, or difficulty breathing (wheezing). The most serious allergy is called anaphylaxis, and can be life-threatening. Many allergies result in hives, also called urticaria.       An allergy happens when the body?s natural defense system (immune system) overreacts to something. The thing that triggers your allergic reaction is called an allergen. The first time you are exposed to your allergen, you may not have any reaction, but the body makes a protein  called an antibody. The antibody lets the body recognize and remember the allergen.  Every time you are exposed to your allergen you get more antibody and your reaction can be more severe.      Generally, every Emergency Department visit should have a follow-up clinic visit with either a primary or a specialty clinic/provider. Please follow-up as instructed by your emergency provider today.    Call 911 if you have:  Swelling of the lips, tongue or throat.  Hoarse voice, drooling or trouble breathing.  Chest pain or shortness of breath.  Fainting or unconsciousness.    What can I do to help myself?  If you know what caused your allergy, do not touch it, throw any of it away, and tell others not to have it around you. Wear a medical alert bracelet with a name of your allergen on it.  If you do not know what you are allergic to, keep a journal of everything that you are exposed to (foods, soaps, medicines, etc.). Take this with you when you follow up with your primary provider or specialist (Allergist). This may help determine what is causing the allergic reaction.  Take any medicines that are prescribed.  Antihistamines can decrease rash or itching. You may use Benadryl  (diphenhydramine) for rash or itching according to package directions, or use a prescription antihistamine as recommended by your provider.  For significant allergic reactions, you may have been given a prescription for an epinephrine (adrenaline) auto injector. Carry this with you at all times! Use it if you are having any symptoms of anaphylaxis.  Do not be afraid to use it. Return to the Emergency Department if you use your auto injector, call 911 if it does not resolve the symptoms. It is only meant to buy time until you can get to the Emergency Department!  If you were given a prescription for medicine here today, be sure to read all of the information (including the package insert) that comes with your prescription.  This will include important  information about the medicine, its side effects, and any warnings that you need to know about.  The pharmacist who fills the prescription can provide more information and answer questions you may have about the medicine.  If you have questions or concerns that the pharmacist cannot address, please call or return to the Emergency Department.   Remember that you can always come back to the Emergency Department if you are not able to see your regular provider in the amount of time listed above, if you get any new symptoms, or if there is anything that worries you.

## 2019-11-07 ENCOUNTER — APPOINTMENT (OUTPATIENT)
Dept: CT IMAGING | Facility: CLINIC | Age: 64
End: 2019-11-07
Attending: NURSE PRACTITIONER
Payer: COMMERCIAL

## 2019-11-07 ENCOUNTER — HOSPITAL ENCOUNTER (EMERGENCY)
Facility: CLINIC | Age: 64
Discharge: HOME | End: 2019-11-07
Attending: NURSE PRACTITIONER | Admitting: NURSE PRACTITIONER
Payer: COMMERCIAL

## 2019-11-07 VITALS
TEMPERATURE: 98.1 F | HEART RATE: 101 BPM | WEIGHT: 235 LBS | RESPIRATION RATE: 22 BRPM | HEIGHT: 70 IN | OXYGEN SATURATION: 96 % | DIASTOLIC BLOOD PRESSURE: 101 MMHG | BODY MASS INDEX: 33.64 KG/M2 | SYSTOLIC BLOOD PRESSURE: 157 MMHG

## 2019-11-07 DIAGNOSIS — M54.2 NECK PAIN ON LEFT SIDE: ICD-10-CM

## 2019-11-07 LAB
ANION GAP SERPL CALCULATED.3IONS-SCNC: 5 MMOL/L (ref 3–14)
BASOPHILS # BLD AUTO: 0.1 10E9/L (ref 0–0.2)
BASOPHILS NFR BLD AUTO: 0.8 %
BUN SERPL-MCNC: 8 MG/DL (ref 7–30)
CALCIUM SERPL-MCNC: 8.3 MG/DL (ref 8.5–10.1)
CHLORIDE SERPL-SCNC: 109 MMOL/L (ref 94–109)
CO2 SERPL-SCNC: 24 MMOL/L (ref 20–32)
CREAT SERPL-MCNC: 0.58 MG/DL (ref 0.66–1.25)
CRP SERPL-MCNC: <2.9 MG/L (ref 0–8)
DIFFERENTIAL METHOD BLD: NORMAL
EOSINOPHIL # BLD AUTO: 0.2 10E9/L (ref 0–0.7)
EOSINOPHIL NFR BLD AUTO: 2.7 %
ERYTHROCYTE [DISTWIDTH] IN BLOOD BY AUTOMATED COUNT: 12.8 % (ref 10–15)
ERYTHROCYTE [SEDIMENTATION RATE] IN BLOOD BY WESTERGREN METHOD: 12 MM/H (ref 0–20)
GFR SERPL CREATININE-BSD FRML MDRD: >90 ML/MIN/{1.73_M2}
GLUCOSE SERPL-MCNC: 115 MG/DL (ref 70–99)
HCT VFR BLD AUTO: 48.3 % (ref 40–53)
HGB BLD-MCNC: 16.7 G/DL (ref 13.3–17.7)
IMM GRANULOCYTES # BLD: 0 10E9/L (ref 0–0.4)
IMM GRANULOCYTES NFR BLD: 0.2 %
LYMPHOCYTES # BLD AUTO: 1.8 10E9/L (ref 0.8–5.3)
LYMPHOCYTES NFR BLD AUTO: 27.6 %
MCH RBC QN AUTO: 33 PG (ref 26.5–33)
MCHC RBC AUTO-ENTMCNC: 34.6 G/DL (ref 31.5–36.5)
MCV RBC AUTO: 96 FL (ref 78–100)
MONOCYTES # BLD AUTO: 1 10E9/L (ref 0–1.3)
MONOCYTES NFR BLD AUTO: 14.4 %
NEUTROPHILS # BLD AUTO: 3.6 10E9/L (ref 1.6–8.3)
NEUTROPHILS NFR BLD AUTO: 54.3 %
NRBC # BLD AUTO: 0 10*3/UL
NRBC BLD AUTO-RTO: 0 /100
PLATELET # BLD AUTO: 305 10E9/L (ref 150–450)
POTASSIUM SERPL-SCNC: 4.2 MMOL/L (ref 3.4–5.3)
RBC # BLD AUTO: 5.06 10E12/L (ref 4.4–5.9)
SODIUM SERPL-SCNC: 138 MMOL/L (ref 133–144)
WBC # BLD AUTO: 6.6 10E9/L (ref 4–11)

## 2019-11-07 PROCEDURE — 25000132 ZZH RX MED GY IP 250 OP 250 PS 637: Performed by: NURSE PRACTITIONER

## 2019-11-07 PROCEDURE — 86140 C-REACTIVE PROTEIN: CPT | Performed by: NURSE PRACTITIONER

## 2019-11-07 PROCEDURE — 99284 EMERGENCY DEPT VISIT MOD MDM: CPT | Mod: 25

## 2019-11-07 PROCEDURE — 85652 RBC SED RATE AUTOMATED: CPT | Performed by: NURSE PRACTITIONER

## 2019-11-07 PROCEDURE — 80048 BASIC METABOLIC PNL TOTAL CA: CPT | Performed by: NURSE PRACTITIONER

## 2019-11-07 PROCEDURE — 70490 CT SOFT TISSUE NECK W/O DYE: CPT

## 2019-11-07 PROCEDURE — 85025 COMPLETE CBC W/AUTO DIFF WBC: CPT | Performed by: NURSE PRACTITIONER

## 2019-11-07 RX ORDER — CYCLOBENZAPRINE HCL 10 MG
10 TABLET ORAL 3 TIMES DAILY PRN
Qty: 20 TABLET | Refills: 0 | Status: SHIPPED | OUTPATIENT
Start: 2019-11-07 | End: 2019-11-14

## 2019-11-07 RX ORDER — HYDROCODONE BITARTRATE AND ACETAMINOPHEN 5; 325 MG/1; MG/1
1 TABLET ORAL ONCE
Status: COMPLETED | OUTPATIENT
Start: 2019-11-07 | End: 2019-11-07

## 2019-11-07 RX ORDER — PREDNISONE 20 MG/1
TABLET ORAL
Qty: 10 TABLET | Refills: 0 | Status: SHIPPED | OUTPATIENT
Start: 2019-11-07

## 2019-11-07 RX ADMIN — HYDROCODONE BITARTRATE AND ACETAMINOPHEN 1 TABLET: 5; 325 TABLET ORAL at 16:23

## 2019-11-07 ASSESSMENT — ENCOUNTER SYMPTOMS
HEADACHES: 0
TROUBLE SWALLOWING: 1
FEVER: 0
FACIAL SWELLING: 1

## 2019-11-07 ASSESSMENT — MIFFLIN-ST. JEOR: SCORE: 1862.2

## 2019-11-07 NOTE — ED AVS SNAPSHOT
Emergency Department  [address] [place] [id number]  Phone:  [phone]  Fax:  [phone]                                    Que Corral   MRN: [id number]    Department:   Emergency Department   Date of Visit:  11/7/2019           After Visit Summary Signature Page    I have received my discharge instructions, and my questions have been answered. I have discussed any challenges I see with this plan with the nurse or doctor.    ..........................................................................................................................................  Patient/Patient Representative Signature      ..........................................................................................................................................  Patient Representative Print Name and Relationship to Patient    ..................................................               ................................................  Date                                   Time    ..........................................................................................................................................  Reviewed by Signature/Title    ...................................................              ..............................................  Date                                               Time          22EPIC Rev 08/18

## 2019-11-07 NOTE — ED TRIAGE NOTES
Left sided neck and facial swelling with pain X 1 week. Pt reports similar episode that occurred in march- was seen here. Reports difficulty swallowing sometimes. Pt states that it began with cold s/sx which have since subsided

## 2019-11-07 NOTE — ED PROVIDER NOTES
History     Chief Complaint:  Facial swelling     HPI  Que Corral is a 63 year old male with history of hypertension and MI (s/p cardiac stent x 7-2005x1, 2016x3, 2017x2) who presents with facial swelling. The patient has had a gradual onset pain on the left side of his face, just posterior to the ear and over the left occiput, for for the last week or so that feels like burning. Today, he noticed subjective swelling to the area. He has had no trouble opening his mouth and is able to move his head from left to right. He notes a resolving upper respiratory infection and feels his left ear is clogged.  He did have a similar pain to the left side of his neck in march and notes symptoms are similar but thinks it was present on both sides of the neck at that time.  Chart review of primary care visit on 4/1/19 reveals pain was located to the left and similar in presentation to today. The patient does report concern for feeling as if the swelling was making it difficult to swallow prompting his concern and visit. He denies any injury, fever, headache, vision changes, numbness or weakness. He adds that his pain is worsened with sitting up.  He denies chest pain, shortness of breath, lightheadedness and notes symptoms are not similar to previous MI.    Allergies:  Contrast dye and Diatrizoate    Medications:    ASPIRIN PO  ATENOLOL PO  CITALOPRAM HYDROBROMIDE PO  hydrOXYzine (VISTARIL) 25 MG capsule  ibuprofen (ADVIL,MOTRIN) 600 MG tablet  isosorbide mononitrate (IMDUR) 30 MG 24 hr tablet  LOVASTATIN PO  oxyCODONE-acetaminophen (PERCOCET) 5-325 MG per tablet    Past Medical History:    Anxiety   Hypertension   Myocardial infarction NSTEMI  Stented coronary artery    Diverticulitis of colon   Hearing loss   Hyperlipidemia   Insomnia   Unspecified disorder of liver     Past Surgical History:    Arthroplasty carpometacarpal   Coronary stent   Place stent aorta   Thumb surgery     Family History:    No past pertinent family  "history.    Social History:  Marital Status:     Smoker:   Former   Smokeless:   Never   Alcohol:   Yes, 3 beers per day     Review of Systems   Constitutional: Negative for fever.   HENT: Positive for facial swelling and trouble swallowing.    Eyes: Negative for visual disturbance.   Neurological: Negative for headaches.   All other systems reviewed and are negative.    Physical Exam     Patient Vitals for the past 24 hrs:   BP Temp Temp src Pulse Resp SpO2 Height Weight   11/07/19 1730 -- -- -- 101 -- -- -- --   11/07/19 1553 (!) 157/101 98.1  F (36.7  C) Oral 117 22 96 % 1.778 m (5' 10\") 106.6 kg (235 lb)     Physical Exam  Nursing notes reviewed. Vitals reviewed.  General: Alert. Well kept.  Eyes:  Conjunctiva non-injected, non-icteric.  Neck/Throat: Moist mucous membranes, tonsils 2+ without erythema or exudate. Uvula midline.  No trismus. No preauricular, submandibular, submental, anterior or posterior cervical lymphadenopathy.  Normal voice. Sublingual area without swelling or pain. No nuchal rigidity.   Cardiac: Regular rhythm. Normal heart sounds with no murmur/rubs/click. No upper or lower extremity edema.  2+ radial pulses.  No carotid bruit.  Pulmonary: Clear and equal breath sounds bilaterally. No crackles/rales. No wheezing  Abdomen: Soft. Non-distended. Non-tender to palpation. No masses. No guarding or rebound.  Musculoskeletal: Normal gross range of motion of all 4 extremities.    Neurological: Alert and oriented x4.  Skin: Warm and dry without rashes or petechiae. Normal appearance of visualized exposed skin.  Psych: Affect normal. Good eye contact.  Neurological:  Mental: alert & oriented x 4, follows commands, no aphasia or dysarthria.   Cranial Nerves:  CN II: visual acuity - able to accurately count fingers with each eye. Visual fields intact,   CN III, IV, VI: EOM intact, pupils equal and reactive  CN V: facial sensation nl  CN VII: face symmetric, no facial droop  CN VIII: hearing " normal  CN IX: palate elevation symmetric, uvula at midline  CN XI SCM normal, shoulder shrug nl  CN XII: tongue midline  Motor: Strength: 5/5 in all major groups of all extremities. Normal tone. No abnormal movements. No pronator drift b/l.  Reflexes:  No clonus noted.   Sensory: temperature, light touch intact.  Gait:  Normal.    Emergency Department Course   Imaging:  Radiographic findings were communicated with the patient who voiced understanding of the findings.    CT Soft Tissue Neck without IV contrast:   Unremarkable CT of the neck without contrast. No evidence  of focal inflammatory process, drainable fluid collection or  sialolithiasis. as per radiology.    Laboratory:  CBC: WBC: 6.6, HGB: 16.7, PLT: 305  BMP: Glucose 115, creatinine 0.58, calcium 8.3, o/w WNL   CRP inflammation: <2.9  Erythrocyte sedimentation rate auto: 12    Interventions:  1623: Norco 5/325 1 tablet PO    Emergency Department Course:  1609 I performed an exam of the patient as documented above.   1730 I rechecked the patient and discussed the results of their workup thus far.     Findings and plan explained. Patient discharged home with instructions regarding supportive care, medications, and reasons to return. The importance of close follow-up was reviewed. I personally reviewed the workup results with them and answered all related questions prior to discharge.    Impression & Plan    Medical Decision Making:  Que Corral is a 63 year old male who presents for evaluation of neck pain. He notes his pain had a gradual onset 3 days ago and is somewhat similar to his episode in March of this year. This pain radiates down the left posterior occiput to just under his ear and along the left superior region of the trapezius and I am able to reproduce his pain with palpation in this area. He feels as if he has swelling over this area. On my evaluation, he has focal tenderness to palpation to the insertion point of the trapezius at the  occiput. I am able to palpate along the trapezius which causes a pain in his left arm. His pain is reproducible. He has normal neurologic examination without deficit. His bilateral TM are normal. He has no preauricular, submental, submandibular, anterior or posterior cervical adenopathy. He has no midline cervical tenderness to palpation or pain with ROM of the neck. He has clear lung sounds and denies chest pain or shortness of breath. He has no fevers and no swelling or tenderness of the sublingual area. The patient has no difficulty of speech, no stridor, and no difficulty with swallowing on my evaluation. Due to concern for a more central cause of his symptoms, a CRP and ESR were obtained and both returned negative today. He is afebrile. He has a normal WBC count and no signs of infectious process. BMP shows no electrolyte abnormality. With concern for infection CT soft tissue of the neck was obtained. He is unable to tolerate contrast. This reveals no evidence of focal inflammatory process, drainable fluid collection or  Sialolithiasis, but does show moderate cervical spine degenerative change is present, worst at C5-6 which is consistent with exam and is likely the cause of his symptoms. He has no signs of infectious process or internal jugular thrombus. There is no indication for admission or further workup today. Symptoms are most consistent with musculoskeletal cause versus occipital neuralgia. He will be started on muscle relaxer and a short course of prednisone fore reproducible radicular symptoms. Symptoms are not consistent with cardiac cause and he denies chest pain. Symptoms are also not consistent with dissection or carotid artery occlusion and he had a normal CTA of the neck in March with similar symptoms.  He will follow up with his primary care physician this week.     Diagnosis:    ICD-10-CM    1. Neck pain on left side M54.2      Disposition:  discharged to home with Flexeril.     Discharge  Medications:  Discharge Medication List as of 11/7/2019  5:56 PM      START taking these medications    Details   cyclobenzaprine (FLEXERIL) 10 MG tablet Take 1 tablet (10 mg) by mouth 3 times daily as needed for muscle spasms, Disp-20 tablet, R-0, Local Print      predniSONE (DELTASONE) 20 MG tablet Take two tablets (= 40mg) each day for 5 (five) days, Disp-10 tablet, R-0, Local Print         Scribe Disclosure: I, Ryan Douglas, am serving as a scribe on 11/7/2019 at 4:09 PM to personally document services performed by Kamilah Knutson CNP based on my observations and the provider's statements to me.      Kamilah Knutson CNP  11/07/19 [id number]

## 2020-02-23 ENCOUNTER — HEALTH MAINTENANCE LETTER (OUTPATIENT)
Age: 65
End: 2020-02-23

## 2020-12-06 ENCOUNTER — HEALTH MAINTENANCE LETTER (OUTPATIENT)
Age: 65
End: 2020-12-06

## 2021-04-11 ENCOUNTER — HEALTH MAINTENANCE LETTER (OUTPATIENT)
Age: 66
End: 2021-04-11

## 2021-08-14 NOTE — LETTER
November 7, 2019      To Whom It May Concern:      Que Corral was seen in our Emergency Department today, 11/07/19. Que should not use ladders until 11/15 or seen by primary care.  He may return to work when improved.    Sincerely,        Kamilah Knutson, CNP         needs pcp

## 2021-09-25 ENCOUNTER — HEALTH MAINTENANCE LETTER (OUTPATIENT)
Age: 66
End: 2021-09-25

## 2022-05-07 ENCOUNTER — HEALTH MAINTENANCE LETTER (OUTPATIENT)
Age: 67
End: 2022-05-07

## 2023-01-07 ENCOUNTER — HEALTH MAINTENANCE LETTER (OUTPATIENT)
Age: 68
End: 2023-01-07

## 2023-06-02 ENCOUNTER — HEALTH MAINTENANCE LETTER (OUTPATIENT)
Age: 68
End: 2023-06-02

## 2024-01-03 ENCOUNTER — HOSPITAL ENCOUNTER (INPATIENT)
Facility: CLINIC | Age: 69
Setting detail: SURGERY ADMIT
End: 2024-01-03
Attending: ORTHOPAEDIC SURGERY | Admitting: ORTHOPAEDIC SURGERY
Payer: COMMERCIAL

## 2024-02-14 RX ORDER — CEFAZOLIN SODIUM/WATER 2 G/20 ML
2 SYRINGE (ML) INTRAVENOUS
Status: CANCELLED | OUTPATIENT
Start: 2024-02-14

## 2024-02-14 RX ORDER — GABAPENTIN 100 MG/1
100 CAPSULE ORAL
Status: CANCELLED | OUTPATIENT
Start: 2024-02-14

## 2024-02-14 RX ORDER — ACETAMINOPHEN 325 MG/1
975 TABLET ORAL ONCE
Status: CANCELLED | OUTPATIENT
Start: 2024-02-14 | End: 2024-02-14

## 2024-02-14 RX ORDER — CEFAZOLIN SODIUM/WATER 2 G/20 ML
2 SYRINGE (ML) INTRAVENOUS SEE ADMIN INSTRUCTIONS
Status: CANCELLED | OUTPATIENT
Start: 2024-02-14

## 2024-03-24 ENCOUNTER — APPOINTMENT (OUTPATIENT)
Dept: CT IMAGING | Facility: CLINIC | Age: 69
End: 2024-03-24
Payer: COMMERCIAL

## 2024-03-24 ENCOUNTER — APPOINTMENT (OUTPATIENT)
Dept: GENERAL RADIOLOGY | Facility: CLINIC | Age: 69
End: 2024-03-24
Payer: COMMERCIAL

## 2024-03-24 ENCOUNTER — HOSPITAL ENCOUNTER (EMERGENCY)
Facility: CLINIC | Age: 69
Discharge: HOME | End: 2024-03-24
Attending: EMERGENCY MEDICINE | Admitting: EMERGENCY MEDICINE
Payer: COMMERCIAL

## 2024-03-24 VITALS
WEIGHT: 225 LBS | RESPIRATION RATE: 14 BRPM | TEMPERATURE: 98 F | DIASTOLIC BLOOD PRESSURE: 97 MMHG | HEIGHT: 70 IN | OXYGEN SATURATION: 97 % | BODY MASS INDEX: 32.21 KG/M2 | HEART RATE: 86 BPM | SYSTOLIC BLOOD PRESSURE: 116 MMHG

## 2024-03-24 DIAGNOSIS — K57.90 DIVERTICULOSIS OF INTESTINE: ICD-10-CM

## 2024-03-24 DIAGNOSIS — R10.9 ABDOMINAL PAIN: ICD-10-CM

## 2024-03-24 DIAGNOSIS — R11.2 NAUSEA AND VOMITING: ICD-10-CM

## 2024-03-24 DIAGNOSIS — R07.9 CHEST PAIN: ICD-10-CM

## 2024-03-24 LAB
ALBUMIN SERPL BCG-MCNC: 3.9 G/DL (ref 3.5–5.2)
ALBUMIN UR-MCNC: NEGATIVE MG/DL
ALP SERPL-CCNC: 127 U/L (ref 40–150)
ALT SERPL W P-5'-P-CCNC: 27 U/L (ref 0–70)
ANION GAP SERPL CALCULATED.3IONS-SCNC: 17 MMOL/L (ref 7–15)
APPEARANCE UR: CLEAR
AST SERPL W P-5'-P-CCNC: 35 U/L (ref 0–45)
BASOPHILS # BLD AUTO: 0.1 10E3/UL (ref 0–0.2)
BASOPHILS NFR BLD AUTO: 1 %
BILIRUB SERPL-MCNC: 0.7 MG/DL
BILIRUB UR QL STRIP: NEGATIVE
BUN SERPL-MCNC: 6.6 MG/DL (ref 8–23)
CALCIUM SERPL-MCNC: 9.7 MG/DL (ref 8.8–10.2)
CHLORIDE SERPL-SCNC: 97 MMOL/L (ref 98–107)
COLOR UR AUTO: YELLOW
CREAT SERPL-MCNC: 0.6 MG/DL (ref 0.67–1.17)
DEPRECATED HCO3 PLAS-SCNC: 21 MMOL/L (ref 22–29)
EGFRCR SERPLBLD CKD-EPI 2021: >90 ML/MIN/1.73M2
EOSINOPHIL # BLD AUTO: 0.1 10E3/UL (ref 0–0.7)
EOSINOPHIL NFR BLD AUTO: 1 %
ERYTHROCYTE [DISTWIDTH] IN BLOOD BY AUTOMATED COUNT: 12.5 % (ref 10–15)
GLUCOSE SERPL-MCNC: 106 MG/DL (ref 70–99)
GLUCOSE UR STRIP-MCNC: NEGATIVE MG/DL
HCT VFR BLD AUTO: 42.5 % (ref 40–53)
HGB BLD-MCNC: 14.3 G/DL (ref 13.3–17.7)
HGB UR QL STRIP: NEGATIVE
IMM GRANULOCYTES # BLD: 0 10E3/UL
IMM GRANULOCYTES NFR BLD: 0 %
KETONES UR STRIP-MCNC: 10 MG/DL
LEUKOCYTE ESTERASE UR QL STRIP: NEGATIVE
LIPASE SERPL-CCNC: 29 U/L (ref 13–60)
LYMPHOCYTES # BLD AUTO: 1.7 10E3/UL (ref 0.8–5.3)
LYMPHOCYTES NFR BLD AUTO: 20 %
MCH RBC QN AUTO: 31.5 PG (ref 26.5–33)
MCHC RBC AUTO-ENTMCNC: 33.6 G/DL (ref 31.5–36.5)
MCV RBC AUTO: 94 FL (ref 78–100)
MONOCYTES # BLD AUTO: 0.8 10E3/UL (ref 0–1.3)
MONOCYTES NFR BLD AUTO: 9 %
MUCOUS THREADS #/AREA URNS LPF: PRESENT /LPF
NEUTROPHILS # BLD AUTO: 5.8 10E3/UL (ref 1.6–8.3)
NEUTROPHILS NFR BLD AUTO: 69 %
NITRATE UR QL: NEGATIVE
NRBC # BLD AUTO: 0 10E3/UL
NRBC BLD AUTO-RTO: 0 /100
PH UR STRIP: 8 [PH] (ref 5–7)
PLATELET # BLD AUTO: 477 10E3/UL (ref 150–450)
POTASSIUM SERPL-SCNC: 4.1 MMOL/L (ref 3.4–5.3)
PROT SERPL-MCNC: 7.5 G/DL (ref 6.4–8.3)
RBC # BLD AUTO: 4.54 10E6/UL (ref 4.4–5.9)
RBC URINE: 2 /HPF
SODIUM SERPL-SCNC: 135 MMOL/L (ref 135–145)
SP GR UR STRIP: 1.02 (ref 1–1.03)
SQUAMOUS EPITHELIAL: 6 /HPF
TROPONIN T SERPL HS-MCNC: 12 NG/L
UROBILINOGEN UR STRIP-MCNC: 2 MG/DL
WBC # BLD AUTO: 8.4 10E3/UL (ref 4–11)
WBC URINE: 6 /HPF

## 2024-03-24 PROCEDURE — 96361 HYDRATE IV INFUSION ADD-ON: CPT

## 2024-03-24 PROCEDURE — 81001 URINALYSIS AUTO W/SCOPE: CPT

## 2024-03-24 PROCEDURE — 99285 EMERGENCY DEPT VISIT HI MDM: CPT | Mod: 25

## 2024-03-24 PROCEDURE — 71046 X-RAY EXAM CHEST 2 VIEWS: CPT

## 2024-03-24 PROCEDURE — 96374 THER/PROPH/DIAG INJ IV PUSH: CPT

## 2024-03-24 PROCEDURE — 96375 TX/PRO/DX INJ NEW DRUG ADDON: CPT

## 2024-03-24 PROCEDURE — 250N000011 HC RX IP 250 OP 636

## 2024-03-24 PROCEDURE — 84484 ASSAY OF TROPONIN QUANT: CPT

## 2024-03-24 PROCEDURE — 250N000011 HC RX IP 250 OP 636: Performed by: EMERGENCY MEDICINE

## 2024-03-24 PROCEDURE — 83690 ASSAY OF LIPASE: CPT

## 2024-03-24 PROCEDURE — 85025 COMPLETE CBC W/AUTO DIFF WBC: CPT | Performed by: EMERGENCY MEDICINE

## 2024-03-24 PROCEDURE — 74176 CT ABD & PELVIS W/O CONTRAST: CPT

## 2024-03-24 PROCEDURE — 258N000003 HC RX IP 258 OP 636: Performed by: EMERGENCY MEDICINE

## 2024-03-24 PROCEDURE — 36415 COLL VENOUS BLD VENIPUNCTURE: CPT | Performed by: EMERGENCY MEDICINE

## 2024-03-24 PROCEDURE — 93005 ELECTROCARDIOGRAM TRACING: CPT

## 2024-03-24 PROCEDURE — 80053 COMPREHEN METABOLIC PANEL: CPT | Performed by: EMERGENCY MEDICINE

## 2024-03-24 PROCEDURE — 96376 TX/PRO/DX INJ SAME DRUG ADON: CPT

## 2024-03-24 RX ORDER — ONDANSETRON 4 MG/1
4 TABLET, ORALLY DISINTEGRATING ORAL EVERY 8 HOURS PRN
Qty: 10 TABLET | Refills: 0 | Status: SHIPPED | OUTPATIENT
Start: 2024-03-24 | End: 2024-03-27

## 2024-03-24 RX ORDER — MORPHINE SULFATE 4 MG/ML
4 INJECTION, SOLUTION INTRAMUSCULAR; INTRAVENOUS ONCE
Status: COMPLETED | OUTPATIENT
Start: 2024-03-24 | End: 2024-03-24

## 2024-03-24 RX ORDER — ONDANSETRON 2 MG/ML
4 INJECTION INTRAMUSCULAR; INTRAVENOUS EVERY 30 MIN PRN
Status: DISCONTINUED | OUTPATIENT
Start: 2024-03-24 | End: 2024-03-24 | Stop reason: HOSPADM

## 2024-03-24 RX ORDER — ONDANSETRON 2 MG/ML
4 INJECTION INTRAMUSCULAR; INTRAVENOUS ONCE
Status: COMPLETED | OUTPATIENT
Start: 2024-03-24 | End: 2024-03-24

## 2024-03-24 RX ADMIN — SODIUM CHLORIDE 1000 ML: 9 INJECTION, SOLUTION INTRAVENOUS at 13:36

## 2024-03-24 RX ADMIN — ONDANSETRON 4 MG: 2 INJECTION INTRAMUSCULAR; INTRAVENOUS at 13:40

## 2024-03-24 RX ADMIN — MORPHINE SULFATE 4 MG: 4 INJECTION, SOLUTION INTRAMUSCULAR; INTRAVENOUS at 14:58

## 2024-03-24 RX ADMIN — ONDANSETRON 4 MG: 2 INJECTION INTRAMUSCULAR; INTRAVENOUS at 14:36

## 2024-03-24 ASSESSMENT — ACTIVITIES OF DAILY LIVING (ADL)
ADLS_ACUITY_SCORE: 35

## 2024-03-24 ASSESSMENT — COLUMBIA-SUICIDE SEVERITY RATING SCALE - C-SSRS
6. HAVE YOU EVER DONE ANYTHING, STARTED TO DO ANYTHING, OR PREPARED TO DO ANYTHING TO END YOUR LIFE?: NO
1. IN THE PAST MONTH, HAVE YOU WISHED YOU WERE DEAD OR WISHED YOU COULD GO TO SLEEP AND NOT WAKE UP?: NO
2. HAVE YOU ACTUALLY HAD ANY THOUGHTS OF KILLING YOURSELF IN THE PAST MONTH?: NO

## 2024-03-24 NOTE — ED PROVIDER NOTES
History     Chief Complaint:  Nausea & Vomiting and Abdominal Pain       HPI   Que Corral is a 68 year old male with history of STEMI status post stent placement, diverticulitis, hyperlipidemia, NSTEMI, spinal stenosis status post lumbar decompression from 3/6 who presents for evaluation of abdominal pain.  The patient states since his surgery on the sixth, he has had intermittent nausea.  He notes that he has been had back pain as well which is gradually improving and is no longer taking opiate medication.  Over the last 2 days, the nausea is worsened and he has had multiple episodes of vomiting.  He has associated left lower quadrant abdominal pain and central chest pressure.  He notes that he has been constipated, but denies any blood in the stool recently.  Patient denies any fever, upper respiratory symptoms, shortness of breath, urinary symptoms, or hematemesis.      Independent Historian:   None - Patient Only    Review of External Notes:   3/6/24: Patient had bilateral Fermin laminotomies and lateral recess decompression at L4-L5 and foraminotomies at L4-L5, L5-S1 performed for spinal canal stenosis.  No notable complications.    Medications:    Aspirin  Atenolol  Citalopram  Vistaril  Imdur  Lovastatin  Deltasone  Toprol XL  Plavix  Percocet    Past Medical History:    Anxiety  Hypertension  MI  Hyperlipidemia   ALICIA  Diverticulitis  Ischemic cardiomyopathy  CAD    Past Surgical History:    Coronary stent  Carpometacarpal arthroplasty   Bilateral knee arthroscopy x2  L4-L5, L5-S1 decompression    Physical Exam   Patient Vitals for the past 24 hrs:   BP Temp Temp src Pulse Resp SpO2 Height Weight   03/24/24 1645 (!) 116/97 -- -- -- 14 97 % -- --   03/24/24 1615 (!) 157/71 -- -- -- -- 97 % -- --   03/24/24 1600 129/75 -- -- 86 -- 96 % -- --   03/24/24 1515 127/72 -- -- -- 14 100 % -- --   03/24/24 1500 134/68 -- -- 70 14 100 % -- --   03/24/24 1445 (!) 142/73 -- -- -- -- -- -- --   03/24/24 1438 137/78 -- --  "62 20 100 % -- --   03/24/24 1309 (!) 143/73 98  F (36.7  C) Temporal 76 16 98 % 1.778 m (5' 10\") 102.1 kg (225 lb)        Physical Exam  General: Alert, well developed, well nourished. Cooperative.     In moderate distress  HEENT:  Head:  Atraumatic  Ears:  External ears are normal  Eyes:   Conjunctivae normal and EOM are normal. No scleral icterus.    Pupils are equal, round, and reactive to light.   Neck:   Normal range of motion. Neck supple.  CV:  Normal rate, regular rhythm, normal heart sounds and radial pulses are 2+ and symmetric.  No murmur.  Resp:  Breath sounds are clear bilaterally    Non-labored, no retractions or accessory muscle use  GI:  TTP over the RUQ, suprapubic area and LLQ. Abdomen is soft, no distension. No rebound or guarding.  No CVA tenderness bilaterally  MS:  Normal range of motion. No edema.    Back atraumatic.    No midline cervical or thoracic tenderness.    Surgical incision covered with steri strips, no drainage or surrounding erythema. Mild TTP.   Skin:  Warm and dry.  No rash or lesions noted.  Neuro:   Alert. Normal strength.   Psych: Normal mood and affect.    Emergency Department Course   ECG  ECG results from 02/23/12   EKG 12 lead     Value    Ventricular Rate 74    Atrial Rate 74    KS Interval 168    QRS Duration 96        QTc 432    P Axis 24    R AXIS -5    T Axis 0    Interpretation ECG Sinus rhythm    Interpretation ECG Normal ECG    Interpretation ECG No previous ECGs available       Imaging:  CT Abdomen Pelvis w/o Contrast   Final Result   IMPRESSION:    1.  No significant findings to explain the left sided pain.      2.  Extensive diverticulosis but nothing for acute diverticulitis.         XR Chest 2 Views   Final Result   IMPRESSION: Negative chest.         Laboratory:  Labs Ordered and Resulted from Time of ED Arrival to Time of ED Departure   COMPREHENSIVE METABOLIC PANEL - Abnormal       Result Value    Sodium 135      Potassium 4.1      Carbon Dioxide " (CO2) 21 (*)     Anion Gap 17 (*)     Urea Nitrogen 6.6 (*)     Creatinine 0.60 (*)     GFR Estimate >90      Calcium 9.7      Chloride 97 (*)     Glucose 106 (*)     Alkaline Phosphatase 127      AST 35      ALT 27      Protein Total 7.5      Albumin 3.9      Bilirubin Total 0.7     CBC WITH PLATELETS AND DIFFERENTIAL - Abnormal    WBC Count 8.4      RBC Count 4.54      Hemoglobin 14.3      Hematocrit 42.5      MCV 94      MCH 31.5      MCHC 33.6      RDW 12.5      Platelet Count 477 (*)     % Neutrophils 69      % Lymphocytes 20      % Monocytes 9      % Eosinophils 1      % Basophils 1      % Immature Granulocytes 0      NRBCs per 100 WBC 0      Absolute Neutrophils 5.8      Absolute Lymphocytes 1.7      Absolute Monocytes 0.8      Absolute Eosinophils 0.1      Absolute Basophils 0.1      Absolute Immature Granulocytes 0.0      Absolute NRBCs 0.0     ROUTINE UA WITH MICROSCOPIC REFLEX TO CULTURE - Abnormal    Color Urine Yellow      Appearance Urine Clear      Glucose Urine Negative      Bilirubin Urine Negative      Ketones Urine 10 (*)     Specific Gravity Urine 1.016      Blood Urine Negative      pH Urine 8.0 (*)     Protein Albumin Urine Negative      Urobilinogen Urine 2.0      Nitrite Urine Negative      Leukocyte Esterase Urine Negative      Mucus Urine Present (*)     RBC Urine 2      WBC Urine 6 (*)     Squamous Epithelials Urine 6 (*)    LIPASE - Normal    Lipase 29     TROPONIN T, HIGH SENSITIVITY - Normal    Troponin T, High Sensitivity 12          Procedures   None    Emergency Department Course & Assessments:    Interventions:  Medications   ondansetron (ZOFRAN) injection 4 mg (4 mg Intravenous $Given 3/24/24 1436)   sodium chloride 0.9% BOLUS 1,000 mL (0 mLs Intravenous Stopped 3/24/24 1436)   ondansetron (ZOFRAN) injection 4 mg (4 mg Intravenous $Given 3/24/24 1340)   morphine (PF) injection 4 mg (4 mg Intravenous $Given 3/24/24 1458)        Independent Interpretation (X-rays, CTs, rhythm  strip):  Chest XR: No evidence of pleural effusion or pneumothorax.     Assessments/Consultations/Discussion of Management or Tests:  Patient was seen in conjunction with Dr. Davis.  1645: Patient reassessed, symptoms improved after interventions, feels comfortable discharging home.  Results reviewed and recommend follow-up with PCP.  Return precautions advised.    Social Determinants of Health affecting care:   None    Disposition:  The patient was discharged to home.     Impression & Plan    CMS Diagnoses: None    Medical Decision Making:  Que Corral is a 68 year old male with history of STEMI status post stent placement, diverticulitis, hyperlipidemia, NSTEMI, spinal stenosis status post lumbar decompression from 3/6 who presents for evaluation of abdominal pain.  On exam, the patient abdominal tenderness over his right upper quadrant, suprapubic area, and left lower quadrant.  Surgical incision appears to be healing well without any signs of infection and the patient does not have any increased back pain since surgery.  Vital signs show mildly elevated blood pressure without fever, tachycardia, or hypoxia.  Blood work shows a mild anion gap with mildly elevated platelets without leukocytosis, anemia, or electrolyte abnormalities.  Lipase is within normal limits.  UA shows no convincing evidence of infection.  Initial troponin is negative.  EKG shows sinus rhythm without any evidence of ischemia.  X-ray of the chest is negative and CT of the abdomen and pelvis shows no acute intra-abdominal pathology.  Patient feels improved after fluids, Zofran, and morphine as noted above and feels comfortable discharging home.  We discussed that the patient should continue laxatives given constipation over the last few days and provided a prescription for Zofran for nausea.  We advise he follow-up with his primary care provider within the next week for reevaluation and ongoing management of symptoms if they persist.  He will  follow-up with his surgeon as scheduled.  He should return to the emergency department for fever, worsening pain, shortness of breath, intractable vomiting, inability to tolerate fluids, syncope, or other new concerns.  The patient was in agreement with this plan and all questions were answered.      Diagnosis:    ICD-10-CM    1. Abdominal pain  R10.9       2. Nausea and vomiting  R11.2       3. Chest pain  R07.9       4. Diverticulosis of intestine  K57.90            Discharge Medications:  New Prescriptions    ONDANSETRON (ZOFRAN ODT) 4 MG ODT TAB    Take 1 tablet (4 mg) by mouth every 8 hours as needed for nausea or vomiting          Scribe Disclosure:  I, Asim Pérez, am serving as a scribe at 1:18 PM on 3/24/2024 to document services personally performed by No att. providers found based on my observations and the provider's statements to me.        Jen Doherty PA-C  03/24/24 0019

## 2024-03-24 NOTE — ED PROVIDER NOTES
ED ATTENDING PHYSICIAN NOTE:   I evaluated this patient in conjunction with Jen Doherty PA-C  I have participated in the care of the patient and personally performed key elements of the history, exam, and medical decision making.      HPI:   Que Corral is a 68 year old male nausea, abdominal pain.  He points to the left lower quadrant as area pain.  Of note he recently had back surgery to the lumbar spine March 6, 2024.  States that he was on narcotic medications has had constipation but discontinued the narcotic medications recently.  Last bowel movement was 2 to 3 days ago.  No episodes of vomiting.  He has been tolerating p.o.    Independent Historian:   None - Patient Only    Review of External Notes:    Reviewed op visit patient had lumbar decompression foraminotomy at [facility].     EXAM:   General:  Alert, interactive  Cardiovascular:  Well perfused  Lungs:  No respiratory distress, no accessory muscle use  Abdomen is soft nontender all throughout  Neuro:  Moving all 4 extremities  Skin:  Warm, dry  Incision to the lumbar spine is clean dry and intact.  Psych:  Normal affect      Social Determinants of Health affecting care:   None     MEDICAL DECISION MAKING/ASSESSMENT AND PLAN:   68-year-old male presenting today with persistent nausea that he has had since recent lumbar surgery earlier this month.  He reports having left lower quadrant abdominal pain.  He has had constipation since his procedure.  His incision appears clean dry and intact.  Denies any new back pain, numbness or tingling down his legs.  He has been ambulatory without any problem.  No recent fevers.  His abdomen is soft and nontender all throughout.  No rebound or guarding.  A CT scan was done without contrast as patient has a contrast allergy.  No signs of any small bowel obstruction noted.  No acute intra abdominal process identified.  UA showed no signs of infection.  He had no leukocytosis.  He was given Zofran and morphine  for pain and reported significant improvement of his symptoms.  He was able to tolerate p.o. without any problem.  Stable for discharge with close follow-up PCP as well as a surgeon.     DIAGNOSIS:     ICD-10-CM    1. Abdominal pain  R10.9       2. Nausea and vomiting  R11.2       3. Chest pain  R07.9       4. Diverticulosis of intestine  K57.90               DISPOSITION:   Discharge     Scribe Disclosure:  IAsim, am serving as a scribe at 1:44 PM on 3/24/2024 to document services personally performed by Ana Davis DO based on my observations and the provider's statements to me.   3/24/2024  [facility] EMERGENCY DEPT     Ana Davis DO  03/24/24 5624

## 2024-08-19 ENCOUNTER — ANCILLARY PROCEDURE (OUTPATIENT)
Dept: ULTRASOUND IMAGING | Facility: CLINIC | Age: 69
End: 2024-08-19
Attending: PHYSICIAN ASSISTANT
Payer: COMMERCIAL

## 2024-08-19 DIAGNOSIS — R93.2 ABNORMAL LIVER DIAGNOSTIC IMAGING: ICD-10-CM

## 2024-08-19 DIAGNOSIS — R79.89 ELEVATED LFTS: ICD-10-CM

## 2024-08-19 PROCEDURE — 76981 USE PARENCHYMA: CPT | Mod: GC | Performed by: STUDENT IN AN ORGANIZED HEALTH CARE EDUCATION/TRAINING PROGRAM

## 2024-10-07 ENCOUNTER — ANCILLARY PROCEDURE (OUTPATIENT)
Dept: MRI IMAGING | Facility: CLINIC | Age: 69
End: 2024-10-07
Attending: PHYSICIAN ASSISTANT
Payer: COMMERCIAL

## 2024-10-07 DIAGNOSIS — K76.0 FATTY INFILTRATION OF LIVER: ICD-10-CM

## 2024-10-07 PROCEDURE — A9585 GADOBUTROL INJECTION: HCPCS | Mod: JZ | Performed by: RADIOLOGY

## 2024-10-07 PROCEDURE — 76391 MR ELASTOGRAPHY: CPT | Performed by: RADIOLOGY

## 2024-10-07 PROCEDURE — 74183 MRI ABD W/O CNTR FLWD CNTR: CPT | Performed by: RADIOLOGY

## 2024-10-07 RX ORDER — GADOBUTROL 604.72 MG/ML
10 INJECTION INTRAVENOUS ONCE
Status: COMPLETED | OUTPATIENT
Start: 2024-10-07 | End: 2024-10-07

## 2024-10-07 RX ADMIN — GADOBUTROL 10 ML: 604.72 INJECTION INTRAVENOUS at 16:37

## 2025-01-23 ENCOUNTER — TRANSCRIBE ORDERS (OUTPATIENT)
Dept: OTHER | Age: 70
End: 2025-01-23

## 2025-01-23 DIAGNOSIS — Z95.1 S/P CABG (CORONARY ARTERY BYPASS GRAFT): ICD-10-CM

## 2025-01-23 DIAGNOSIS — I25.10 CAD (CORONARY ARTERY DISEASE): Primary | ICD-10-CM

## 2025-02-03 ENCOUNTER — HOSPITAL ENCOUNTER (OUTPATIENT)
Dept: CARDIAC REHAB | Facility: CLINIC | Age: 70
Discharge: HOME | End: 2025-02-03
Payer: COMMERCIAL

## 2025-02-03 PROCEDURE — 93798 PHYS/QHP OP CAR RHAB W/ECG: CPT | Performed by: CLINICAL EXERCISE PHYSIOLOGIST

## 2025-02-05 ENCOUNTER — HOSPITAL ENCOUNTER (EMERGENCY)
Facility: CLINIC | Age: 70
Discharge: HOME | End: 2025-02-05
Attending: EMERGENCY MEDICINE
Payer: COMMERCIAL

## 2025-02-05 VITALS
RESPIRATION RATE: 16 BRPM | SYSTOLIC BLOOD PRESSURE: 138 MMHG | DIASTOLIC BLOOD PRESSURE: 83 MMHG | TEMPERATURE: 98 F | HEART RATE: 100 BPM | OXYGEN SATURATION: 100 %

## 2025-02-05 DIAGNOSIS — R04.0 EPISTAXIS: ICD-10-CM

## 2025-02-05 PROCEDURE — 99283 EMERGENCY DEPT VISIT LOW MDM: CPT | Mod: 25

## 2025-02-05 PROCEDURE — 30901 CONTROL OF NOSEBLEED: CPT | Mod: RT

## 2025-02-05 RX ORDER — TRANEXAMIC ACID 100 MG/ML
500 INJECTION, SOLUTION INTRAVENOUS ONCE
Status: DISCONTINUED | OUTPATIENT
Start: 2025-02-05 | End: 2025-02-05 | Stop reason: HOSPADM

## 2025-02-05 ASSESSMENT — ACTIVITIES OF DAILY LIVING (ADL)
ADLS_ACUITY_SCORE: 41

## 2025-02-05 NOTE — ED TRIAGE NOTES
Pt arrives via triage presenting with R nostril epistaxis since 0900. Pt on thinners. Clap in place.      Triage Assessment (Adult)       Row Name 02/05/25 1630          Triage Assessment    Airway WDL WDL        Respiratory WDL    Respiratory WDL WDL        Skin Circulation/Temperature WDL    Skin Circulation/Temperature WDL WDL        Cardiac WDL    Cardiac WDL WDL        Peripheral/Neurovascular WDL    Peripheral Neurovascular WDL WDL        Cognitive/Neuro/Behavioral WDL    Cognitive/Neuro/Behavioral WDL WDL

## 2025-02-05 NOTE — ED PROVIDER NOTES
Emergency Department Note      History of Present Illness     Chief Complaint   Epistaxis      HPI   Que Corral is a 68 year old male with a history of coronary artery disease status post CABG 1/15/2025 on aspirin and Plavix who presents alone for evaluation of epistaxis.  This started this morning, 7 hours prior to arrival, when he was blowing his nose.  He has had epistaxis in the past which he attributes to dry air as well as frequent sneezing from having 3 pets in the home.    Independent Historian   None    Review of External Notes   I reviewed discharge summary from 01/20/25 after CABG.  I reviewed primary care follow-up visit from 01/22/25.  I reviewed ED visit for epistaxis in January as well.    Past Medical History     Medical History and Problem List   Hearing loss  Post-traumatic osteoarthritis of right knee  Chronic pain of right knee  Coronary artery disease involving native coronary artery of native heart without angina pectoris   Mobitz type I Wenckebach atrioventricular block  Anxiety state   Disorder of liver   Diverticulitis of colon  Unspecified sleep apnea  Spinal stenosis of lumbar region with neurogenic claudication  B12 deficiency   Acute ST elevation myocardial infarction (STEMI) of inferior wall  Insomnia   Arteriosclerotic heart disease  Mixed hyperlipidemia   Hypertension   Vitamin D deficiency   NSTEMI (non-ST elevated myocardial infarction)  COVID-19 virus infection  Coronary atherosclerosis of unspecified type of vessel, native or graft  Restless legs syndrome   History of basal cell cancer  Eczema   Xerosis of skin  Actinic skin damage   Ischemic cardiomyopathy  Fatty liver     Medications   Aspirin 81   Plavix   Atenolol   Citalopram hydrobromide   Hydroxyzine  Isosorbide mononitrate   Lovastatin   Oxycodone  Prednisone   Nitroglycerin  Simvastatin  "  Alprazolam  Amlopidine  Atorvastatin  Bisacodyl  Cefuroxime  Lisinopril  Methocarbamol  Metropolol  Metronidazole  Omeprazole  Senna    Surgical History   Total right knee replacement   History of intravascular stent placement   Coronary stent placement   [place] teeth extraction   Thumb surgery   Bone spur foot  Knee arthroscopy  Cataract extraction  [place] unlisted procedure spine   Coronary artery bypass graftin    Physical Exam     Patient Vitals for the past 24 hrs:   BP Temp Pulse Resp SpO2   02/05/25 1631 138/83 98  F (36.7  C) 100 16 100 %     Physical Exam  General: WD/WN; well appearing elderly occasion man; cooperative  Head:  Atraumatic  Eyes:  Conjunctivae, lids, and sclerae are normal  ENT:    MMM. The left nare and septum are normal.  There is active bleeding from a site on the septal mucosa on the right.  Neck:  Supple; normal ROM  Resp:  No respiratory distress  GI:  Nondistended    MS:  Normal ROM  Skin:  Warm; non-diaphoretic; no pallor  Neuro:  Awake; A&Ox3; normal gait  Psych: Normal mood and affect; normal speech  Vitals reviewed.     Diagnostics     Lab Results   Labs Ordered and Resulted from Time of ED Arrival to Time of ED Departure - No data to display    Imaging   No orders to display       EKG   ECG taken at ***, ECG read at ***  ***   *** as compared to prior, dated ***/***/***.  Rate *** bpm. [place] interval *** ms. QRS duration *** ms. QT/QTc ***/*** ms. P-R-T axes *** *** ***.    Independent Interpretation   {IndependentReview:638388::\"None\"}    ED Course      Medications Administered   Medications - No data to display    Procedures   Procedures       Epistaxis Care     Procedure: Epistaxis Care    Indication: Epistaxis    Consent: Verbal    Medication: Patient was topically medicated with {Medication Epistaxis:807007}    Procedure detail: {Procedure Detail:170369}  Patient was closely monitored and did not have evidence of recurrent bleeding***.     Patient Status: The patient tolerated " "the procedure well: {YES/NO:989570}. There were no complications***.     Discussion of Management   {Consults/Care Discussions:675770::\"None\"}    ED Course   ED Course as of 02/05/25 1722 Wed Feb 05, 2025   1650 I obtained the history and examined the patient as noted above.     1710 I rechecked the patient, TXA soaked pledget placed with cotton ball.        Additional Documentation  Social Determinants of Health: Has an established care team and has pets.     Medical Decision Making / Diagnosis     CMS Diagnoses: {Sepsis/Septic Shock/Stemi/Stroke:767686::\"None\"}    MIPS       {EPPA MIPS:272184::\"None\"}    RADHA Corral is a 69 year old male ***    Disposition   {EPPAFV Dispo:665721}    Diagnosis   No diagnosis found.     Discharge Medications   New Prescriptions    No medications on file         Scribe Disclosure:  I, Jenni Lutz, am serving as a scribe at 5:01 PM on 2/5/2025 to document services personally performed by Maya Ac MD based on my observations and the provider's statements to me.     "

## 2025-02-06 NOTE — DISCHARGE INSTRUCTIONS
Continue all of your medications.  Consider an antihistamine.  Use a humidifier.  Use Vaseline inside your nostril to help seal in moisture with this dry air.  Return if you have another nosebleed you cannot get to stop at home with your clamp.

## 2025-02-10 ENCOUNTER — HOSPITAL ENCOUNTER (OUTPATIENT)
Dept: CARDIAC REHAB | Facility: CLINIC | Age: 70
Discharge: HOME | End: 2025-02-10
Attending: STUDENT IN AN ORGANIZED HEALTH CARE EDUCATION/TRAINING PROGRAM
Payer: COMMERCIAL

## 2025-02-10 PROCEDURE — 93798 PHYS/QHP OP CAR RHAB W/ECG: CPT

## 2025-02-24 ENCOUNTER — HOSPITAL ENCOUNTER (OUTPATIENT)
Dept: CARDIAC REHAB | Facility: CLINIC | Age: 70
Discharge: HOME | End: 2025-02-24
Attending: STUDENT IN AN ORGANIZED HEALTH CARE EDUCATION/TRAINING PROGRAM
Payer: COMMERCIAL

## 2025-02-24 PROCEDURE — 93798 PHYS/QHP OP CAR RHAB W/ECG: CPT | Performed by: REHABILITATION PRACTITIONER

## 2025-03-03 ENCOUNTER — HOSPITAL ENCOUNTER (OUTPATIENT)
Dept: CARDIAC REHAB | Facility: CLINIC | Age: 70
Discharge: HOME | End: 2025-03-03
Attending: STUDENT IN AN ORGANIZED HEALTH CARE EDUCATION/TRAINING PROGRAM
Payer: COMMERCIAL

## 2025-03-03 PROCEDURE — 93798 PHYS/QHP OP CAR RHAB W/ECG: CPT | Performed by: REHABILITATION PRACTITIONER

## 2025-04-21 ENCOUNTER — ANCILLARY PROCEDURE (OUTPATIENT)
Dept: MRI IMAGING | Facility: CLINIC | Age: 70
End: 2025-04-21
Attending: PHYSICIAN ASSISTANT
Payer: COMMERCIAL

## 2025-04-21 ENCOUNTER — ANCILLARY PROCEDURE (OUTPATIENT)
Dept: GENERAL RADIOLOGY | Facility: CLINIC | Age: 70
End: 2025-04-21
Attending: PHYSICIAN ASSISTANT
Payer: COMMERCIAL

## 2025-04-21 DIAGNOSIS — D49.0 IPMN (INTRADUCTAL PAPILLARY MUCINOUS NEOPLASM): ICD-10-CM

## 2025-04-21 DIAGNOSIS — K74.00 LIVER FIBROSIS: ICD-10-CM

## 2025-04-21 PROCEDURE — 71046 X-RAY EXAM CHEST 2 VIEWS: CPT | Performed by: RADIOLOGY

## 2025-04-21 PROCEDURE — 74183 MRI ABD W/O CNTR FLWD CNTR: CPT | Performed by: RADIOLOGY

## 2025-04-21 PROCEDURE — A9585 GADOBUTROL INJECTION: HCPCS | Mod: JZ | Performed by: RADIOLOGY

## 2025-04-21 RX ORDER — GADOBUTROL 604.72 MG/ML
10 INJECTION INTRAVENOUS ONCE
Status: COMPLETED | OUTPATIENT
Start: 2025-04-21 | End: 2025-04-21

## 2025-04-21 RX ADMIN — GADOBUTROL 10 ML: 604.72 INJECTION INTRAVENOUS at 10:04
